# Patient Record
Sex: MALE | Race: WHITE | NOT HISPANIC OR LATINO | URBAN - METROPOLITAN AREA
[De-identification: names, ages, dates, MRNs, and addresses within clinical notes are randomized per-mention and may not be internally consistent; named-entity substitution may affect disease eponyms.]

---

## 2020-12-22 ENCOUNTER — WEB ENCOUNTER (OUTPATIENT)
Dept: URBAN - METROPOLITAN AREA CLINIC 115 | Facility: CLINIC | Age: 35
End: 2020-12-22

## 2020-12-22 ENCOUNTER — OFFICE VISIT (OUTPATIENT)
Dept: URBAN - METROPOLITAN AREA CLINIC 115 | Facility: CLINIC | Age: 35
End: 2020-12-22
Payer: COMMERCIAL

## 2020-12-22 ENCOUNTER — DASHBOARD ENCOUNTERS (OUTPATIENT)
Age: 35
End: 2020-12-22

## 2020-12-22 DIAGNOSIS — L29.0 RECTAL ITCHING: ICD-10-CM

## 2020-12-22 DIAGNOSIS — K64.9 HEMORRHOIDS: ICD-10-CM

## 2020-12-22 DIAGNOSIS — R19.8 RECTAL DISCHARGE: ICD-10-CM

## 2020-12-22 DIAGNOSIS — K62.89 RECTAL PAIN: ICD-10-CM

## 2020-12-22 DIAGNOSIS — K64.8 BLEEDING INTERNAL HEMORRHOIDS: ICD-10-CM

## 2020-12-22 DIAGNOSIS — K61.0 ANAL ABSCESS: ICD-10-CM

## 2020-12-22 DIAGNOSIS — K60.3 ANAL FISTULA: ICD-10-CM

## 2020-12-22 PROCEDURE — G9903 PT SCRN TBCO ID AS NON USER: HCPCS | Performed by: INTERNAL MEDICINE

## 2020-12-22 PROCEDURE — G8417 CALC BMI ABV UP PARAM F/U: HCPCS | Performed by: INTERNAL MEDICINE

## 2020-12-22 PROCEDURE — 99204 OFFICE O/P NEW MOD 45 MIN: CPT | Performed by: INTERNAL MEDICINE

## 2020-12-22 PROCEDURE — G8427 DOCREV CUR MEDS BY ELIG CLIN: HCPCS | Performed by: INTERNAL MEDICINE

## 2020-12-22 PROCEDURE — G8484 FLU IMMUNIZE NO ADMIN: HCPCS | Performed by: INTERNAL MEDICINE

## 2020-12-22 PROCEDURE — 1036F TOBACCO NON-USER: CPT | Performed by: INTERNAL MEDICINE

## 2020-12-22 RX ORDER — METRONIDAZOLE 500 MG/1
1 TABLET TABLET ORAL TWICE A DAY
Qty: 20 TABLET | Refills: 0 | OUTPATIENT
Start: 2020-12-27 | End: 2021-01-06

## 2020-12-22 NOTE — PHYSICAL EXAM GASTROINTESTINAL
Abdomen , soft, nontender, nondistended , no guarding or rigidity , no masses palpable , normal bowel sounds , Scar present Liver and Spleen , no hepatomegaly present , no hepatosplenomegaly , liver nontender , spleen not palpable ,   Rectal , normal sphincter tone, no bleeding present. Small opening on the left side anterior to the anus. Indurated mass draining  yellow pus. Tenderness in anal canal.  Anoscopy: Tiny  non inflamed Fistula track inside the anus anteriorly Large right anterior hemorrhoids present. Moderate amount of stool present. Anoscope

## 2020-12-22 NOTE — HPI-TODAY'S VISIT:
Patient is a 35 year old male who is present for a gastroenterologist visit for evaluation of rectal pain, itching, discharge and change in bowel habits.   12/22/20  Patient reports rectal pain began in October. Reports a knot/nodule that stings and a scab forms over it. He went to urgent care and was diagnosed with an abscess.  Was prescribed bactrim and did SITZ baths. The abscess has resolved but there is still pain and greenish colored pus.  Bright red blood upon wiping which he believes comes from the abscess. Denies fever and chills. Maternal uncle has crohn's disease. He is in his 60's which he believes is well managed but he is not sure. He does not think he has had surgery.  Patient had an 11 pound cyst that was removed from his liver in his childhood.  Patient also has polyps in his sinus cavity.  Father had prostate cancer. Paternal father has brain cancer. Discussed anal abscess may be associated with Crohn's disease.   Time spent with patient: 29 min.

## 2020-12-23 ENCOUNTER — TELEPHONE ENCOUNTER (OUTPATIENT)
Dept: URBAN - METROPOLITAN AREA CLINIC 115 | Facility: CLINIC | Age: 35
End: 2020-12-23

## 2020-12-23 RX ORDER — METRONIDAZOLE 500 MG/1
1 TABLET TABLET ORAL TWICE A DAY
Qty: 20 TABLET | Refills: 0 | Status: ACTIVE | COMMUNITY
Start: 2020-12-27 | End: 2021-01-06

## 2020-12-23 RX ORDER — METRONIDAZOLE 500 MG/1
1 TABLET TABLET ORAL TWICE A DAY
Qty: 20 TABLET | Refills: 0 | OUTPATIENT
Start: 2021-01-11 | End: 2021-01-21

## 2020-12-27 ENCOUNTER — TELEPHONE ENCOUNTER (OUTPATIENT)
Dept: URBAN - METROPOLITAN AREA CLINIC 5 | Facility: CLINIC | Age: 35
End: 2020-12-27

## 2020-12-28 ENCOUNTER — WEB ENCOUNTER (OUTPATIENT)
Dept: URBAN - METROPOLITAN AREA CLINIC 35 | Facility: CLINIC | Age: 35
End: 2020-12-28

## 2021-01-05 ENCOUNTER — OFFICE VISIT (OUTPATIENT)
Dept: URBAN - METROPOLITAN AREA CLINIC 33 | Facility: CLINIC | Age: 36
End: 2021-01-05

## 2021-01-13 ENCOUNTER — OFFICE VISIT (OUTPATIENT)
Dept: URBAN - METROPOLITAN AREA CLINIC 115 | Facility: CLINIC | Age: 36
End: 2021-01-13

## 2024-03-01 NOTE — PHYSICAL EXAM HENT:
Head, normocephalic, atraumatic, Face, Face within normal limits, Ears, External ears within normal limits, Nose/Nasopharynx, External nose normal appearance, nares patent, no nasal discharge, Mouth and Throat, Patient is wearing a mask due to COVID - 19
Yes, see eMAR